# Patient Record
Sex: MALE | Race: WHITE | NOT HISPANIC OR LATINO | Employment: FULL TIME | URBAN - METROPOLITAN AREA
[De-identification: names, ages, dates, MRNs, and addresses within clinical notes are randomized per-mention and may not be internally consistent; named-entity substitution may affect disease eponyms.]

---

## 2018-12-07 ENCOUNTER — HOSPITAL ENCOUNTER (EMERGENCY)
Facility: HOSPITAL | Age: 23
Discharge: HOME | End: 2018-12-08
Attending: EMERGENCY MEDICINE | Admitting: NURSE PRACTITIONER

## 2018-12-07 DIAGNOSIS — T78.3XXA ANGIOEDEMA OF LIPS, INITIAL ENCOUNTER: Primary | ICD-10-CM

## 2018-12-07 PROCEDURE — 99283 EMERGENCY DEPT VISIT LOW MDM: CPT

## 2018-12-07 PROCEDURE — 63710000001 PREDNISONE PER 1 MG: Performed by: NURSE PRACTITIONER

## 2018-12-07 PROCEDURE — 63710000001 DIPHENHYDRAMINE PER 50 MG: Performed by: NURSE PRACTITIONER

## 2018-12-07 RX ORDER — DIPHENHYDRAMINE HCL 50 MG
50 CAPSULE ORAL ONCE
Status: COMPLETED | OUTPATIENT
Start: 2018-12-07 | End: 2018-12-07

## 2018-12-07 RX ORDER — FAMOTIDINE 20 MG/1
20 TABLET, FILM COATED ORAL ONCE
Status: COMPLETED | OUTPATIENT
Start: 2018-12-07 | End: 2018-12-07

## 2018-12-07 RX ORDER — PREDNISONE 20 MG/1
60 TABLET ORAL ONCE
Status: COMPLETED | OUTPATIENT
Start: 2018-12-07 | End: 2018-12-07

## 2018-12-07 RX ORDER — FAMOTIDINE 20 MG/1
20 TABLET, FILM COATED ORAL 2 TIMES DAILY
Qty: 6 TABLET | Refills: 0 | Status: SHIPPED | OUTPATIENT
Start: 2018-12-07

## 2018-12-07 RX ORDER — METHYLPREDNISOLONE 4 MG/1
TABLET ORAL
Qty: 21 TABLET | Refills: 0 | Status: SHIPPED | OUTPATIENT
Start: 2018-12-07

## 2018-12-07 RX ADMIN — FAMOTIDINE 20 MG: 20 TABLET ORAL at 22:44

## 2018-12-07 RX ADMIN — DIPHENHYDRAMINE HYDROCHLORIDE 50 MG: 50 CAPSULE ORAL at 22:44

## 2018-12-07 RX ADMIN — PREDNISONE 60 MG: 20 TABLET ORAL at 22:44

## 2018-12-08 VITALS
OXYGEN SATURATION: 96 % | HEART RATE: 88 BPM | DIASTOLIC BLOOD PRESSURE: 81 MMHG | RESPIRATION RATE: 18 BRPM | TEMPERATURE: 98.2 F | BODY MASS INDEX: 31.89 KG/M2 | HEIGHT: 63 IN | SYSTOLIC BLOOD PRESSURE: 128 MMHG | WEIGHT: 180 LBS

## 2018-12-08 NOTE — ED PROVIDER NOTES
Subjective   Virgil Marshall is a 23 y.o. male who presents to the emergency department with complaints of right sided facial swelling that started this morning when he woke up. The patient states that he thinks he had an allergic reaction to the grape flavoring. The patient states that he works at [employer] and had a bag of candy burst that could be causing his swelling. The patient reports that he had one previous episode with facial swelling that was caused by artifical grape and his symptoms today are the same as before. He denies any itching, rash, fever, soa, sore throat, difficulty breathing, and any other acute symptoms at this time.        History provided by:  Patient  Facial Swelling   Severity:  Moderate  Onset quality:  Sudden  Timing:  Constant  Progression:  Improving  Chronicity:  New  Associated symptoms: no fever, no rash, no shortness of breath and no sore throat        Review of Systems   Constitutional: Negative for fever.   HENT: Positive for facial swelling. Negative for sore throat.    Respiratory: Negative for shortness of breath.    Skin: Negative for rash.        No itching    All other systems reviewed and are negative.      Past Medical History:   Diagnosis Date   • Migraine        No Known Allergies    History reviewed. No pertinent surgical history.    History reviewed. No pertinent family history.    Social History     Socioeconomic History   • Marital status: Single     Spouse name: Not on file   • Number of children: Not on file   • Years of education: Not on file   • Highest education level: Not on file   Tobacco Use   • Smoking status: Former Smoker   • Tobacco comment: quit 6 months ago   Substance and Sexual Activity   • Alcohol use: No     Frequency: Never   • Drug use: No         Objective   Physical Exam   Constitutional: He is oriented to person, place, and time. He appears well-developed and well-nourished. No distress.   HENT:   Head: Normocephalic and atraumatic.   The patient  does not have tongue swelling or stridor    Eyes: Conjunctivae are normal. No scleral icterus.   Neck: Normal range of motion. Neck supple.   Cardiovascular: Normal rate, regular rhythm, normal heart sounds and intact distal pulses. Exam reveals no gallop and no friction rub.   No murmur heard.  Pulmonary/Chest: Effort normal and breath sounds normal. No respiratory distress. He has no wheezes. He has no rales.   Abdominal: Soft. Bowel sounds are normal. There is no tenderness. There is no guarding.   Musculoskeletal: Normal range of motion.   Neurological: He is alert and oriented to person, place, and time.   Skin: Skin is warm and dry. No rash noted. He is not diaphoretic.   The patients location of swelling is adjacent to body piercing on right lower lip and there is no current pain, redness, or induration adjacent to the piercing. The patient recalls swelling from a prior episode of angioedema with exposure to color flavoring. The patient has no other angioedema.   Psychiatric: He has a normal mood and affect. His behavior is normal.   Nursing note and vitals reviewed.      Procedures         ED Course  ED Course as of Dec 07 2358   Fri Dec 07, 2018   2355 Patient has remarkable improvement in the angioedema in his lip.  Will DC with parameters for concern that warrant return to the ED and patient concurs.   [ML]      ED Course User Index  [ML] Gavi Araya, APRVALERIE       No results found for this or any previous visit (from the past 24 hour(s)).  Note: In addition to lab results from this visit, the labs listed above may include labs taken at another facility or during a different encounter within the last 24 hours. Please correlate lab times with ED admission and discharge times for further clarification of the services performed during this visit.    No orders to display     Vitals:    12/07/18 2200   BP: 128/81   BP Location: Left arm   Patient Position: Sitting   Pulse: 90   Resp: 18   Temp: 98.2 °F  "(36.8 °C)   TempSrc: Oral   SpO2: 97%   Weight: 81.6 kg (180 lb)   Height: 160 cm (63\")     Medications   famotidine (PEPCID) tablet 20 mg (20 mg Oral Given 12/7/18 2244)   diphenhydrAMINE (BENADRYL) capsule 50 mg (50 mg Oral Given 12/7/18 2244)   predniSONE (DELTASONE) tablet 60 mg (60 mg Oral Given 12/7/18 2244)     ECG/EMG Results (last 24 hours)     ** No results found for the last 24 hours. **                      [facility]    Final diagnoses:   Angioedema of lips, initial encounter       Documentation assistance provided by sagar Smith.  Information recorded by the scribe was done at my direction and has been verified and validated by me.     Alicia Smith  12/07/18 7175       Gavi Araya, CLAUDE  12/07/18 8869    "

## 2018-12-08 NOTE — DISCHARGE INSTRUCTIONS
Home to rest.  Take benadryl 25mg every 8 hours for the next 24 hours, then as needed.  Add Pepcid twice daily for three days, and begin medrol dose pack if symptoms recur.  Return to the ED as needed for worsening symptoms or concerns.  Thank you     Follow up with one of the [facility] Primary Care Providers below to setup primary care. If you need assistance coordinating a primary care appointment with a [facility] Primary Care Provider, please contact the Primary Care Coordinators at [phone] for appointment scheduling.    [facility], Primary Care   [address], [address]   [place], [place] [address] [phone]    [facility] Internal Medicine & Endocrinology  [address], [address]  [place] [address] [phone]    [facility] Family Medicine  [address], [address]   [place] [address] [phone]    [facility] Primary Care  [address], [address]  [place] [address]  [phone]    [facility], Primary Care,   [address], [address]   [place], [place] [id number] [phone]    [facility] Primary Care  [address]  [place], [place] [id number] [phone]    [facility], Primary Care  [address], [address]  [place], [place] [id number] [phone]    [facility] Internal Medicine & Pediatrics  [address], [address]   [place] [address] [phone]    [facility], Primary Care  [address] [place] [address] [phone]      [facility] Primary [facility]  [address], [address]   [place] [address] [phone]    [facility] Family Medicine  [address] [place] [address] [place] [address]  [phone]    Follow up with one of the physician centers below to  setup primary care.    [facility], [phone], [address], [address], [place] [address]    [facility], [phone], [address], [address] [place] [address], [phone], [address] [place] [address];     [facility], [phone], [address] [place] [address]